# Patient Record
(demographics unavailable — no encounter records)

---

## 2025-01-13 NOTE — REASON FOR VISIT
[FreeTextEntry1] : snowboarding a few weeks ago, sat to take his boots off. bent to take off a binding and then slumped over to the left with no feeling on that side. started breathing very heavily. son in law did quick [place] Stroke score. seen by /100 mm Hg then to [facility]. CT scan of the head was negative except for a partially calcified meningioma. Seeing Dr. Rishi Ramires. Had eyes, neck, MRA, MRI and all that. EEG soon. Was given 20 mg of atorvastatin.   left frontal lobe partly calcified extra-axial seen 2.5 x 1.1 cm.   Surgical hip replacement, 3rd and 4th vertebrae fusion, torn ligament on right elbow.  Parents are . Father had cancer of the outside of the lung mets to the brain. Mom had bone cancer and  at 87.  He has one sister. She is .  . He has 2 boys and 2 girls.  Working as a . In his building 85 years this year.   Active all day long. Does bicycle for exercise. 15-20 minutes daily.   Shahana? Rik.

## 2025-01-13 NOTE — REASON FOR VISIT
[FreeTextEntry1] : snowboarding a few weeks ago, sat to take his boots off. bent to take off a binding and then slumped over to the left with no feeling on that side. started breathing very heavily. son in law did quick [place] Stroke score. seen by /100 mm Hg then to [facility]. CT scan of the head was negative except for a partially calcified meningioma. Seeing Dr. Rishi Ramires. Had eyes, neck, MRA, MRI and all that. EEG soon. Was given 20 mg of atorvastatin.   left frontal lobe partly calcified extra-axial seen 2.5 x 1.1 cm.   Surgical hip replacement, 3rd and 4th vertebrae fusion, torn ligament on right elbow.  Parents are . Father had cancer of the outside of the lung mets to the brain. Mom had bone cancer and  at 87.  He has one sister. She is .  . He has 2 boys and 2 girls.  Working as a . In his building 85 years this year.   Active all day long. Does bicycle for exercise. 15-20 minutes daily.   Shahana? Rik.

## 2025-01-22 NOTE — CARDIOLOGY SUMMARY
[de-identified] : 1/13/2025. Sinus bradycardia at 57 bpm. Normal axis. Normal intervals and morphologies. Normal R-wave progression.

## 2025-01-22 NOTE — DISCUSSION/SUMMARY
[FreeTextEntry1] : He is a 73-year-old with recent TIA following snowboarding who presents today to establish care.   His blood pressure today is 158/74 mm Hg. Will continue to monitor for now. No plan to begin antihypertensive therapy at this time given recent neurological event.  Will increase atorvastatin to 40 mg daily. Continue low dose daily aspirin. He has been outfitted with an extended cardiac monitor to assess for any evidence of atrial fibrillation which may explain his recent neurological event.  He will schedule an echocardiogram for structural heart evaluation.  Case discussed with Loi Ramires MD via phone.  Follow up in the office in one month.  [EKG obtained to assist in diagnosis and management of assessed problem(s)] : EKG obtained to assist in diagnosis and management of assessed problem(s)

## 2025-01-22 NOTE — CARDIOLOGY SUMMARY
[de-identified] : 1/13/2025. Sinus bradycardia at 57 bpm. Normal axis. Normal intervals and morphologies. Normal R-wave progression.

## 2025-01-22 NOTE — HISTORY OF PRESENT ILLNESS
[FreeTextEntry1] : Calvin Ricketts presents today to establish care.  His neighbors, Elías and Grecia KELLY are patient of this office as well.   He is a year old with recent transient ischemic attack. While snowboarding a few weeks ago, he sat to take his boots off. He bent to take off a binding and then slumped over to the left with no feeling on that side. He started breathing very heavily and his son-in-law did quick stroke assessment score before calling EMS. He was found to have blood pressure of 200/100 mm Hg by EMS and was then transported to [facility]. CT scan of the head was negative except for a partially calcified meningioma. He has been seen by Dr. Rishi Ramires who performed studies of the neck and head and started atorvastatin 20 mg daily and aspirin. He is pending EEG later this week.    Surgical history includes hip replacement, 3rd and 4th vertebrae fusion, torn ligament on right elbow.  Parents are . Father had cancer of the outside of the lung mets to the brain. Mom had bone cancer and  at 87.  He has one sister. She is .  . He has 2 boys and 2 girls.  Working as a . In his building 85 years this year.   He remains fully active all day long and does a bicycle for exercise. 15-20 minutes daily. No chest discomfort, shortness of breath, palpitations, lightheadedness or syncope.

## 2025-01-22 NOTE — HISTORY OF PRESENT ILLNESS
[FreeTextEntry1] : Calvin Ricketts presents today to establish care.  His neighbors, Elías and Grecia KELLY are patient of this office as well.   He is a year old with recent transient ischemic attack. While snowboarding a few weeks ago, he sat to take his boots off. He bent to take off a binding and then slumped over to the left with no feeling on that side. He started breathing very heavily and his son-in-law did quick stroke assessment score before calling EMS. He was found to have blood pressure of 200/100 mm Hg by EMS and was then transported to [facility]. CT scan of the head was negative except for a partially calcified meningioma. He has been seen by Dr. Rishi Ramires who performed studies of the neck and head and started atorvastatin 20 mg daily and aspirin. He is pending EEG later this week.    Surgical history includes hip replacement, 3rd and 4th vertebrae fusion, torn ligament on right elbow.  Parents are . Father had cancer of the outside of the lung mets to the brain. Mom had bone cancer and  at 87.  He has one sister. She is .  . He has 2 boys and 2 girls.  Working as a . In his building 85 years this year.   He remains fully active all day long and does a bicycle for exercise. 15-20 minutes daily. No chest discomfort, shortness of breath, palpitations, lightheadedness or syncope.

## 2025-02-17 NOTE — REASON FOR VISIT
[FreeTextEntry1] : 2/12/2025 Calvin returns today for scheduled follow up and an echocardiogram. He has seen Dr. Ramires and completed imaging of the head but does not yet have the results. He completed the images at [facility] in [place]. For his birthday he enjoyed a cruise to [place] and the [facility]. Today he reports feeling well and has no specific complaints. He is looking forward to snowboarding this weekend with the [employer].

## 2025-02-17 NOTE — REASON FOR VISIT
[FreeTextEntry1] : 2/12/2025 Calvin returns today for scheduled follow up and an echocardiogram. He has seen Dr. Ramires and completed imaging of the head but does not yet have the results. He completed the images at [facility] in [place]. For his birthday he enjoyed a cruise to [place] and the [facility]. Today he reports feeling well and has no specific complaints. He is looking forward to snowboarding this weekend with the [employer].

## 2025-02-17 NOTE — END OF VISIT
[Time Spent: ___ minutes] : I have spent [unfilled] minutes of time on the encounter which excludes teaching and separately reported services. [FreeTextEntry3] : I, Irving Tan MD, personally performed the evaluation and management (E/M) services for this established patient who presents today with (a) new problem(s)/exacerbation of (an) existing condition(s). That E/M includes conducting the clinically appropriate interval history &/or exam, assessing all new/exacerbated conditions, and establishing a new plan of care. Today, Li Vuong NP was here to observe my evaluation and management service for this new problem/exacerbated condition and follow the plan of care established by me going forward.

## 2025-02-17 NOTE — CARDIOLOGY SUMMARY
[de-identified] : 1/13/2025. Sinus bradycardia at 57 bpm. Normal axis. Normal intervals and morphologies. Normal R-wave progression.  [de-identified] : 2/12/2025. TTE.  1. Left ventricular systolic function is normal with an ejection fraction of 68 % by Fontenot's method of disks. 2. Normal right ventricular cavity size and normal right ventricular systolic function. 3. Left atrium is mildly dilated. 4. Aortic root at the sinuses of Valsalva is dilated, measuring 4.30 cm (indexed 1.89 cm/m). Ascending aorta is dilated, measuring 4.50 cm (indexed 1.98 cm/m). 5. Mild to moderate aortic regurgitation. 6. No prior echocardiogram is available for comparison. [de-identified] : MARLEENO. HR minimum 44 to 203 BPM. Average HR 65. Underlying rhythm was SR. 17 runs of SVT (7 beats max rate 203, longest 17 beatrs 122). No AF.

## 2025-02-17 NOTE — HISTORY OF PRESENT ILLNESS
[FreeTextEntry1] : Calvin Ricketts presents today to establish care.  His neighbors, Elías and Grecia KELLY are patient of this office as well.   He is a year old with recent transient ischemic attack. While snowboarding a few weeks ago, he sat to take his boots off. He bent to take off a binding and then slumped over to the left with no feeling on that side. He started breathing very heavily and his son-in-law did quick stroke assessment score before calling EMS. He was found to have blood pressure of 200/100 mm Hg by EMS and was then transported to [facility]. CT scan of the head was negative except for a partially calcified meningioma. He has been seen by Dr. Rishi Ramires who performed studies of the neck and head and started atorvastatin 20 mg daily and aspirin. He is pending EEG later this week.    Surgical history includes hip replacement, 3rd and 4th vertebrae fusion, torn ligament on right elbow.  Parents are . Father had cancer of the outside of the lung mets to the brain. Mom had bone cancer and  at 87.  He has one sister. She is .  . He has 2 boys and 2 girls.  Working as a . In his building 85 years this year.   He remains fully active all day long and does a bicycle for exercise. 15-20 minutes daily. No chest discomfort, shortness of breath, palpitations, lightheadedness or syncope.

## 2025-02-17 NOTE — DISCUSSION/SUMMARY
[FreeTextEntry1] : He is a 74-year-old with recent TIA following snowboarding who presents today for follow up.   His blood pressure today is 132/68 mm Hg. Will continue to monitor for now. No plan to begin antihypertensive therapy at this time given recent neurological event.  Continue atorvastatin to 40 mg daily. Continue low dose daily aspirin. Extended cardiac monitor as above, no atrial fibrillations seen.  TTE from today as above. He will schedule a CT of the aorta to further assess seen dilation of the aortic root.   Follow up in 6 months.

## 2025-02-17 NOTE — CARDIOLOGY SUMMARY
[de-identified] : 1/13/2025. Sinus bradycardia at 57 bpm. Normal axis. Normal intervals and morphologies. Normal R-wave progression.  [de-identified] : 2/12/2025. TTE.  1. Left ventricular systolic function is normal with an ejection fraction of 68 % by Fontenot's method of disks. 2. Normal right ventricular cavity size and normal right ventricular systolic function. 3. Left atrium is mildly dilated. 4. Aortic root at the sinuses of Valsalva is dilated, measuring 4.30 cm (indexed 1.89 cm/m). Ascending aorta is dilated, measuring 4.50 cm (indexed 1.98 cm/m). 5. Mild to moderate aortic regurgitation. 6. No prior echocardiogram is available for comparison. [de-identified] : [place]. HR minimum 44 to 203 BPM. Average HR 65. Underlying rhythm was SR. 17 runs of SVT (7 beats max rate 203, longest 17 beatrs 122). No AF.